# Patient Record
Sex: MALE | Race: WHITE
[De-identification: names, ages, dates, MRNs, and addresses within clinical notes are randomized per-mention and may not be internally consistent; named-entity substitution may affect disease eponyms.]

---

## 2020-03-16 ENCOUNTER — HOSPITAL ENCOUNTER (OUTPATIENT)
Dept: HOSPITAL 7 - FB.SDS | Age: 55
Discharge: HOME | End: 2020-03-16
Attending: SURGERY
Payer: COMMERCIAL

## 2020-03-16 VITALS — SYSTOLIC BLOOD PRESSURE: 113 MMHG | DIASTOLIC BLOOD PRESSURE: 63 MMHG | HEART RATE: 73 BPM

## 2020-03-16 DIAGNOSIS — Z98.890: ICD-10-CM

## 2020-03-16 DIAGNOSIS — Z86.018: ICD-10-CM

## 2020-03-16 DIAGNOSIS — K63.5: ICD-10-CM

## 2020-03-16 DIAGNOSIS — Z12.11: Primary | ICD-10-CM

## 2020-03-16 PROCEDURE — 45380 COLONOSCOPY AND BIOPSY: CPT

## 2020-03-16 PROCEDURE — 88305 TISSUE EXAM BY PATHOLOGIST: CPT

## 2020-03-16 NOTE — PCM.OPNOTE
- General Post-Op/Procedure Note


Date of Surgery/Procedure: 03/16/20


Operative Procedure(s): c scope


Findings: 





descending colon polyp 


Pre Op Diagnosis: hx of colon polyp


Post-Op Diagnosis: descending colon polyp


Primary Surgeon: Jose Alberto Orellana


Anesthesia Provider: Roberto Sun


Pathology: 





colon polyp


Complications: None


Condition: Good


Free Text/Narrative:: 





see dictation

## 2020-03-16 NOTE — OR
DATE OF OPERATION:  03/16/2020

 

SURGEON:  Jose Alberto Orellana MD

 

PROCEDURE PERFORMED:  Colonoscopy with cold forceps biopsy.

 

PREOPERATIVE DIAGNOSIS:  Personal history of colon polyps.

 

POSTOPERATIVE DIAGNOSIS:  Polyp of the descending colon.

 

INDICATIONS FOR PROCEDURE:  This is a 54-year-old white male with a known

history of adenomatous polyps.  He is due for his followup colonoscopy.  He was

offered and accepted same.

 

DESCRIPTION OF OPERATION:  After an excellent IV sedation was administered,

digital rectal exam was performed.  No marked abnormality was noted.  Flexible

colonoscope was inserted and advanced to the cecum.  Prep was excellent.

Following findings were noted.  Ascending colon, unremarkable.  Transverse

colon, unremarkable.  Descending colon, small polyp, biopsied with cold biopsy

forceps and sent for permanent.  Sigmoid and rectum, unremarkable.  The patient

tolerated the procedure well.  Results will be sent by letter.

 

Job#: [id number]

DD: 03/16/2020 0834

DT: 03/16/2020 1328 AS/MODL